# Patient Record
Sex: MALE | Race: WHITE | NOT HISPANIC OR LATINO | URBAN - METROPOLITAN AREA
[De-identification: names, ages, dates, MRNs, and addresses within clinical notes are randomized per-mention and may not be internally consistent; named-entity substitution may affect disease eponyms.]

---

## 2022-09-23 ENCOUNTER — APPOINTMENT (OUTPATIENT)
Dept: URBAN - METROPOLITAN AREA CLINIC 194 | Age: 62
Setting detail: DERMATOLOGY
End: 2022-09-26

## 2022-09-23 VITALS — SYSTOLIC BLOOD PRESSURE: 128 MMHG | RESPIRATION RATE: 16 BRPM | HEART RATE: 58 BPM | DIASTOLIC BLOOD PRESSURE: 60 MMHG

## 2022-09-23 VITALS
SYSTOLIC BLOOD PRESSURE: 130 MMHG | TEMPERATURE: 98.7 F | DIASTOLIC BLOOD PRESSURE: 58 MMHG | WEIGHT: 235 LBS | HEART RATE: 56 BPM | RESPIRATION RATE: 18 BRPM | HEIGHT: 70 IN

## 2022-09-23 DIAGNOSIS — Z12.83 ENCOUNTER FOR SCREENING FOR MALIGNANT NEOPLASM OF SKIN: ICD-10-CM

## 2022-09-23 DIAGNOSIS — L57.8 OTHER SKIN CHANGES DUE TO CHRONIC EXPOSURE TO NONIONIZING RADIATION: ICD-10-CM

## 2022-09-23 PROBLEM — C44.311 BASAL CELL CARCINOMA OF SKIN OF NOSE: Status: ACTIVE | Noted: 2022-09-23

## 2022-09-23 PROCEDURE — 17311 MOHS 1 STAGE H/N/HF/G: CPT

## 2022-09-23 PROCEDURE — OTHER MIPS QUALITY: OTHER

## 2022-09-23 PROCEDURE — OTHER MOHS SURGERY: OTHER

## 2022-09-23 PROCEDURE — 13152 CMPLX RPR E/N/E/L 2.6-7.5 CM: CPT

## 2022-09-23 PROCEDURE — 17312 MOHS ADDL STAGE: CPT

## 2022-09-23 PROCEDURE — 99204 OFFICE O/P NEW MOD 45 MIN: CPT | Mod: 25

## 2022-09-23 PROCEDURE — OTHER SURGICAL DECISION MAKING: OTHER

## 2022-09-23 PROCEDURE — OTHER COUNSELING: OTHER

## 2022-09-23 PROCEDURE — OTHER ASC CONSULTATION: OTHER

## 2022-09-23 NOTE — PROCEDURE: MOHS SURGERY
Veena with [facility] called and requested the pre op notes from the visit on 4/3. She states that it needs to be signed an she also needs and lab work that was done and the EKG hard tracing.    Please fax to [phone].   Unna Boot Text: An Unna boot was placed to help immobilize the limb and facilitate more rapid healing.

## 2022-09-23 NOTE — PROCEDURE: MOHS SURGERY
HTN (hypertension) Where Do You Want The Question To Include Opioid Counseling Located?: Case Summary Tab

## 2022-09-30 ENCOUNTER — APPOINTMENT (OUTPATIENT)
Dept: URBAN - METROPOLITAN AREA CLINIC 194 | Age: 62
Setting detail: DERMATOLOGY
End: 2022-09-30

## 2022-09-30 PROBLEM — Z48.01 ENCOUNTER FOR CHANGE OR REMOVAL OF SURGICAL WOUND DRESSING: Status: ACTIVE | Noted: 2022-09-30

## 2022-09-30 PROCEDURE — OTHER DRESSING CHANGE (GLOBAL PERIOD): OTHER

## 2022-09-30 PROCEDURE — 99024 POSTOP FOLLOW-UP VISIT: CPT

## 2022-09-30 NOTE — PROCEDURE: DRESSING CHANGE (GLOBAL PERIOD)
Add 34391 Cpt? (Important Note: In 2017 The Use Of 61145 Is Being Tracked By Cms To Determine Future Global Period Reimbursement For Global Periods): yes
Detail Level: Detailed

## 2024-03-21 NOTE — PROCEDURE: MOHS SURGERY
Likely reactive due to acute medical issue(s)  Monitor platelet count   Referred To Asc For Closure Text (Leave Blank If You Do Not Want): After obtaining clear surgical margins the patient was sent to an ASC for surgical repair.  The patient understands they will receive post-surgical care and follow-up from the ASC physician.

## 2024-05-02 NOTE — PROCEDURE: MOHS SURGERY
[facility] EMERGENCY DEP  EMERGENCY DEPARTMENT ENCOUNTER       Pt Name: Nilson Marcial Sr.  MRN: [id number]  Birthdate 2/22/1925  Date of evaluation: 5/1/2024  Provider: Elliot Cardoza MD   PCP: Gilberto Mott MD  Note Started: 5:36 AM EDT 5/2/24     CHIEF COMPLAINT       Chief Complaint   Patient presents with    Wound Check        HISTORY OF PRESENT ILLNESS: 1 or more elements      History From: Patient  Limitations in obtaining HPI include None     Nilson Marcial Sr. is a 89 y.o. male who presents ambulatory stating that he needs to have a dressing change.  Patient had a skin cancer removed from his scalp by dermatology several weeks ago and had the wound redressed today.  It was packed and a adhesive dressing was applied over the packing.  The adhesive dressing came off and the patient's travel home and he presents to ED for replacement of the adhesive dressing.  There is been no bleeding.  Patient has no pain or other complaints.     Nursing Notes were all reviewed and agreed with or any disagreements were addressed in the HPI.     REVIEW OF SYSTEMS      Positives and Pertinent negatives as per HPI.    PAST HISTORY     Past Medical History:  Past Medical History:   Diagnosis Date    Anemia     Hb 9.6 2/16    Arrhythmia     PAC's, PVC's, short SVT up to 4 beats--Holter3/16    At risk for sleep apnea     ?QUINCY 4    CAD (coronary artery disease), native coronary artery     Cath 2006--Dr. Ogden 60 LAD, 50 RCA-medical rx    Chronic kidney disease     stage III    Fatigue     Fracture of ankle, right, closed     GERD (gastroesophageal reflux disease)     Hypomagnesemia     Hypothyroidism     Ill-defined condition     \"I'm known as a bleeder\"    Sarah's syndrome     s/p multiple decompressions    Prolonged P-R interval 6/7/2021    Since 2016 ECGs show  - 300    Prostate cancer (HCC) 1999    prostate tx seed implants    S/P cardiac cath 6/7/2021 6/7/2021 PCI/JASON x 5 to prox and mid LAD, OM1 and mRCA     Consent 1/Introductory Paragraph: The rationale for Mohs was explained to the patient and consent was obtained. The risks, benefits and alternatives to therapy were discussed in detail. Specifically, the risks of infection, scarring, bleeding, prolonged wound healing, incomplete removal, allergy to anesthesia, nerve injury and recurrence were addressed. Prior to the procedure, the treatment site was clearly identified and confirmed by the patient. All components of Universal Protocol/PAUSE Rule completed.